# Patient Record
Sex: FEMALE | NOT HISPANIC OR LATINO | Employment: FULL TIME | URBAN - METROPOLITAN AREA
[De-identification: names, ages, dates, MRNs, and addresses within clinical notes are randomized per-mention and may not be internally consistent; named-entity substitution may affect disease eponyms.]

---

## 2017-07-25 ENCOUNTER — NON-PROVIDER VISIT (OUTPATIENT)
Dept: URGENT CARE | Facility: PHYSICIAN GROUP | Age: 32
End: 2017-07-25

## 2017-07-25 DIAGNOSIS — Z02.1 PRE-EMPLOYMENT DRUG SCREENING: ICD-10-CM

## 2017-07-25 LAB
AMP AMPHETAMINE: NEGATIVE
BAR BARBITURATES: NEGATIVE
BZO BENZODIAZEPINES: NEGATIVE
COC COCAINE: NEGATIVE
INT CON NEG: NEGATIVE
INT CON POS: POSITIVE
MDMA ECSTASY: NEGATIVE
MET METHAMPHETAMINES: NEGATIVE
MTD METHADONE: NEGATIVE
OPI OPIATES: NEGATIVE
OXY OXYCODONE: NEGATIVE
PCP PHENCYCLIDINE: NEGATIVE
POC URINE DRUG SCREEN OCDRS: NORMAL
THC: NEGATIVE

## 2017-07-25 PROCEDURE — 80305 DRUG TEST PRSMV DIR OPT OBS: CPT | Performed by: PHYSICIAN ASSISTANT

## 2018-03-01 ENCOUNTER — OFFICE VISIT (OUTPATIENT)
Dept: URGENT CARE | Facility: PHYSICIAN GROUP | Age: 33
End: 2018-03-01
Payer: COMMERCIAL

## 2018-03-01 VITALS
DIASTOLIC BLOOD PRESSURE: 64 MMHG | TEMPERATURE: 98.8 F | HEIGHT: 63 IN | HEART RATE: 69 BPM | WEIGHT: 156.4 LBS | OXYGEN SATURATION: 99 % | BODY MASS INDEX: 27.71 KG/M2 | SYSTOLIC BLOOD PRESSURE: 100 MMHG

## 2018-03-01 DIAGNOSIS — B07.9 VIRAL WART ON FINGER: ICD-10-CM

## 2018-03-01 DIAGNOSIS — L81.1 MELASMA: ICD-10-CM

## 2018-03-01 DIAGNOSIS — M62.838 MUSCLE SPASM OF LEFT SHOULDER AREA: Primary | ICD-10-CM

## 2018-03-01 DIAGNOSIS — M25.571 ARTHRALGIA OF ANKLE, RIGHT: ICD-10-CM

## 2018-03-01 PROCEDURE — 99204 OFFICE O/P NEW MOD 45 MIN: CPT | Mod: 25 | Performed by: PHYSICIAN ASSISTANT

## 2018-03-01 RX ORDER — CYCLOBENZAPRINE HCL 10 MG
10 TABLET ORAL 2 TIMES DAILY PRN
Qty: 30 TAB | Refills: 0 | Status: SHIPPED | OUTPATIENT
Start: 2018-03-01

## 2018-03-01 ASSESSMENT — PAIN SCALES - GENERAL: PAINLEVEL: 7=MODERATE-SEVERE PAIN

## 2018-03-01 ASSESSMENT — ENCOUNTER SYMPTOMS: MYALGIAS: 1

## 2018-03-02 NOTE — PROGRESS NOTES
Subjective:      Yanni Hansen is a 32 y.o. female who presents with Joint Pain (joint pain on shoulder, elbow, ankle, bumps on both hands, spots on face, x20 days )    PMH:  Reviewed with patient/family member/EPIC.   MEDS:   Current Outpatient Prescriptions:   •  cyclobenzaprine (FLEXERIL) 10 MG Tab, Take 1 Tab by mouth 2 times a day as needed., Disp: 30 Tab, Rfl: 0  ALLERGIES: Not on File  SURGHX: History reviewed. No pertinent surgical history.  SOCHX:  reports that she has never smoked. She has never used smokeless tobacco. She reports that she does not drink alcohol or use drugs.  FH: Reviewed with patient/family. Not pertinent to this complaint.          Patient presents to clinic today with multiple medical complaints.    First complaint: Patient complains of left neck and shoulder muscle spasm on and off for a few months. Patient states she does a very repetitive job and sometimes shoulder is sore when she finished with the week. Patient has not been taking any over-the-counter medications for this pain. Patient denies numbness or tingling to fingers, denies chest pain or shortness of breath.    Second complaint: Patient is complaining about multiple warts on her right pinky ring and index finger, and her left middle finger and thumb. Patient is unable to recall how long this has been there.    Third complaint: Patient is complaining about right ankle pain on and off for a few months. Patient states she does a lot of walking at work, though she denies injuring her ankle at any time. Patient states it's just that sometimes. Patient wears athletic shoes to work and has purchased a new pair within the last 2 months.  Patient denies numbness or tingling to for, swelling to ankle or foot, injury to ankle. Patient has not taking any medication for her ankle pain.    Fourth complaint: Patient is complaining about darkening rash across her forehead I lateral cheeks and upper lip. Patient denies redness to the rash,  "blisters, lesions, pustules. Patient has not tried anything to treat this rash.        Review of Systems   Musculoskeletal: Positive for joint pain and myalgias.   Skin: Positive for rash.   All other systems reviewed and are negative.         Objective:     /64   Pulse 69   Temp 37.1 °C (98.8 °F)   Ht 1.6 m (5' 3\")   Wt 70.9 kg (156 lb 6.4 oz)   SpO2 99%   BMI 27.71 kg/m²      Physical Exam   Constitutional: She is oriented to person, place, and time. She appears well-developed and well-nourished. No distress.   HENT:   Head: Normocephalic and atraumatic.       Nose: Nose normal.   Mouth/Throat: Oropharynx is clear and moist.   Eyes: Conjunctivae and EOM are normal. Pupils are equal, round, and reactive to light.   Neck: Normal range of motion. Neck supple.   Cardiovascular: Normal rate, regular rhythm and normal heart sounds.    Pulmonary/Chest: Effort normal and breath sounds normal.   Musculoskeletal: Normal range of motion.        Left shoulder: She exhibits tenderness, pain and spasm. She exhibits normal range of motion, no bony tenderness, no deformity, normal pulse and normal strength.        Right ankle: She exhibits normal range of motion, no swelling, no ecchymosis, no deformity, no laceration and normal pulse. No tenderness.        Arms:       Right hand: Normal sensation noted. Normal strength noted.        Hands:       Feet:    Neurological: She is alert and oriented to person, place, and time. Gait normal.   Skin: Skin is warm and dry. Capillary refill takes less than 2 seconds. Rash noted. Rash is not maculopapular, not nodular, not pustular, not vesicular and not urticarial.   Psychiatric: She has a normal mood and affect.   Nursing note and vitals reviewed.         Assessment/Plan:     1. Muscle spasm of left shoulder area  cyclobenzaprine (FLEXERIL) 10 MG Tab   2. Viral wart on finger  cyclobenzaprine (FLEXERIL) 10 MG Tab   3. Melasma  cyclobenzaprine (FLEXERIL) 10 MG Tab   4. " Arthralgia of ankle, right  cyclobenzaprine (FLEXERIL) 10 MG Tab     Motrin/Advil/Ibuprophen 600 mg every 6 hours as needed for pain or fever.    PT encouraged to by gel inserts for her shoes, which may help her ankle pain (which is not present until she walks a long time at work.)    OTC Compound W for warts.    OTC esoterica type cream for face to see if it has any effect.  PT also instructed to sign up for PCP to further evaluate all of these problems as they are more appropriate for primary care evaluation.     PT instructed not to drive or operate heavy machinery or drink alcohol while taking this medication because it contains either narcotic/benzodiazepines and causes drowsiness. PT verbalized understanding of these instructions.     PT should follow up with PCP in 1-2 days for re-evaluation if symptoms have not improved.  Discussed red flags and reasons to return to UC or ED.  Pt and/or family verbalized understanding of diagnosis and follow up instructions and was offered informational handout on diagnosis.  PT discharged.

## 2018-03-02 NOTE — PATIENT INSTRUCTIONS
Melasma  Introduction  Melasma is a skin condition that causes areas of darker coloring. It usually appears on your cheeks, forehead, upper lip, and neck. It does not spread from person to person (non-contagious). These patches can look like a mask. The discolored areas do not itch and are not red or swollen.  What are the causes?  The cause of melasma is not known. However, it can be started by certain triggers, such as:  · Being out in the sun.  · Allergies to medicines or cosmetics.  · Variations in your hormones, such as:  ¨ Taking birth control.  ¨ Taking hormone replacement therapy.  ¨ Being pregnant.  What increases the risk?  Risk factors for melasma include:  · Being a woman. It is less common in men.  · Having a family history of melasma.  · Having darker skin.  · Living in a tropical climate.  What are the signs or symptoms?  There are no symptoms except for the discolored skin with dark or tan blotches.  How is this diagnosed?  Melasma is diagnosed based on its physical appearance. Your health care provider may use an ultraviolet light called Wood’s lamp to look more closely at your skin. Your health care provider may also take a small sample of your skin (biopsy). This is done to make sure your melasma is not caused by another skin condition.  How is this treated?  There is no cure for melasma. However, some treatments are available that may lighten the color of the darker patches. Treatment may include:  · Medicines, such as bleaching or steroid creams.  · Facial or chemical peels.  · Laser treatment.  · Dermabrasion or microdermabrasion.  Your melasma may also go away on its own over time.  Follow these instructions at home:  · Avoid overexposure to the sun, especially in tropical areas.  · Wear sun screen of SPF 30 or higher every day.  · Apply medicines only as directed by your health care provider.  · Wear a hat that protects your face from the sun.  · Keep all follow-up visits as directed by your  health care provider. This is important.  How is this prevented?  · Wear sunscreen of SPF 30 or higher every day.  · Wear a hat that protects your face from the sun.  · Do not use wax to remove excess hair in areas where you have or have had melasma.  · Avoid overexposure to the sun, especially in tropical areas.  · Use gentle cosmetics that are meant for sensitive skin.  Contact a health care provider if:  · You have new symptoms.  · Your symptoms get worse.  · Your affected skin areas are:  ¨ Bleeding.  ¨ Irritated.  This information is not intended to replace advice given to you by your health care provider. Make sure you discuss any questions you have with your health care provider.  Document Released: 03/09/2004 Document Revised: 05/25/2017 Document Reviewed: 07/21/2015  © 2017 [employer]

## 2018-04-19 ENCOUNTER — TELEPHONE (OUTPATIENT)
Dept: MEDICAL GROUP | Facility: PHYSICIAN GROUP | Age: 33
End: 2018-04-19

## 2018-04-19 NOTE — TELEPHONE ENCOUNTER
Future Appointments       Provider Department Center    4/20/2018 8:40 AM Clarisse Dumont M.D. [facility]        NEW PATIENT VISIT PRE-VISIT PLANNING    1.  [employer] Patient is checked in Patient Demographics? YES    2.  Immunizations were [facility] using [facility]?: Yes       •  Web Iz Recommendations: TDAP    3.  Is this appointment scheduled as a Hospital Follow-Up? No    4.  Patient is due for the following Health Maintenance Topics:   Health Maintenance Due   Topic Date Due   • IMM DTaP/Tdap/Td Vaccine (1 - Tdap) 03/03/2002   • PAP SMEAR  03/03/2004       5.  Reviewed/Updated the following with patient:   •   Preferred Pharmacy? NO       •   Preferred Lab? NO       •   Preferred Communication? NO       •   Allergies? NO       •   Medications? NO       •   Social History? NO       •   Family History (document living status of immediate family members and if + hx of cancer, diabetes, hypertension, hyperlipidemia, heart attack, stroke) NO    6.  Updated Care Team?       •   DME Company (gait device, O2, CPAP, etc.) NO       •   Other Specialists (eye doctor, derm, GYN, cardiology, endo, etc): NO    7.  MDX printed for Provider? NO INS [employer]     8.  Patient was informed to arrive 15 min prior to their   scheduled appointment and bring in their medication bottles. Was unable to get in contact with pt prior to visit